# Patient Record
Sex: MALE | Race: WHITE | Employment: UNEMPLOYED | URBAN - METROPOLITAN AREA
[De-identification: names, ages, dates, MRNs, and addresses within clinical notes are randomized per-mention and may not be internally consistent; named-entity substitution may affect disease eponyms.]

---

## 2021-04-18 ENCOUNTER — HOSPITAL ENCOUNTER (INPATIENT)
Age: 21
LOS: 1 days | Discharge: HOME | DRG: 203 | End: 2021-04-19
Attending: EMERGENCY MEDICINE | Admitting: INTERNAL MEDICINE
Payer: COMMERCIAL

## 2021-04-18 ENCOUNTER — APPOINTMENT (OUTPATIENT)
Dept: GENERAL RADIOLOGY | Age: 21
DRG: 203 | End: 2021-04-18
Attending: PHYSICIAN ASSISTANT
Payer: COMMERCIAL

## 2021-04-18 ENCOUNTER — HOSPITAL ENCOUNTER (EMERGENCY)
Age: 21
Discharge: HOME | DRG: 203 | End: 2021-04-18
Attending: EMERGENCY MEDICINE
Payer: COMMERCIAL

## 2021-04-18 VITALS
RESPIRATION RATE: 16 BRPM | OXYGEN SATURATION: 100 % | HEIGHT: 71 IN | DIASTOLIC BLOOD PRESSURE: 70 MMHG | TEMPERATURE: 98.4 F | BODY MASS INDEX: 23.1 KG/M2 | HEART RATE: 99 BPM | SYSTOLIC BLOOD PRESSURE: 125 MMHG | WEIGHT: 165 LBS

## 2021-04-18 DIAGNOSIS — J45.41 MODERATE PERSISTENT ASTHMA WITH ACUTE EXACERBATION: Primary | ICD-10-CM

## 2021-04-18 DIAGNOSIS — J45.51 SEVERE PERSISTENT ASTHMA WITH ACUTE EXACERBATION: Primary | ICD-10-CM

## 2021-04-18 PROBLEM — J45.901 ACUTE ASTHMA EXACERBATION: Status: ACTIVE | Noted: 2021-04-18

## 2021-04-18 LAB
ALBUMIN SERPL-MCNC: 4.2 G/DL (ref 3.4–5)
ALBUMIN/GLOB SERPL: 1.2 {RATIO} (ref 0.8–1.7)
ALP SERPL-CCNC: 99 U/L (ref 45–117)
ALT SERPL-CCNC: 29 U/L (ref 16–61)
ANION GAP SERPL CALC-SCNC: 14 MMOL/L (ref 3–18)
AST SERPL-CCNC: 22 U/L (ref 10–38)
BASOPHILS # BLD: 0 K/UL (ref 0–0.1)
BASOPHILS NFR BLD: 0 % (ref 0–2)
BILIRUB SERPL-MCNC: 0.5 MG/DL (ref 0.2–1)
BNP SERPL-MCNC: 121 PG/ML (ref 0–450)
BUN SERPL-MCNC: 10 MG/DL (ref 7–18)
BUN/CREAT SERPL: 9 (ref 12–20)
CALCIUM SERPL-MCNC: 9.4 MG/DL (ref 8.5–10.1)
CHLORIDE SERPL-SCNC: 105 MMOL/L (ref 100–111)
CK MB CFR SERPL CALC: 1.1 % (ref 0–4)
CK MB SERPL-MCNC: 1.7 NG/ML (ref 5–25)
CK SERPL-CCNC: 154 U/L (ref 39–308)
CO2 SERPL-SCNC: 20 MMOL/L (ref 21–32)
CREAT SERPL-MCNC: 1.16 MG/DL (ref 0.6–1.3)
DIFFERENTIAL METHOD BLD: ABNORMAL
EOSINOPHIL # BLD: 0 K/UL (ref 0–0.4)
EOSINOPHIL NFR BLD: 0 % (ref 0–5)
ERYTHROCYTE [DISTWIDTH] IN BLOOD BY AUTOMATED COUNT: 13.2 % (ref 11.6–14.5)
GLOBULIN SER CALC-MCNC: 3.5 G/DL (ref 2–4)
GLUCOSE SERPL-MCNC: 199 MG/DL (ref 74–99)
HCT VFR BLD AUTO: 46.2 % (ref 36–48)
HGB BLD-MCNC: 16.1 G/DL (ref 13–16)
LYMPHOCYTES # BLD: 0.4 K/UL (ref 0.9–3.6)
LYMPHOCYTES NFR BLD: 3 % (ref 21–52)
MCH RBC QN AUTO: 30.5 PG (ref 24–34)
MCHC RBC AUTO-ENTMCNC: 34.8 G/DL (ref 31–37)
MCV RBC AUTO: 87.5 FL (ref 74–97)
MONOCYTES # BLD: 0.1 K/UL (ref 0.05–1.2)
MONOCYTES NFR BLD: 1 % (ref 3–10)
NEUTS SEG # BLD: 10.8 K/UL (ref 1.8–8)
NEUTS SEG NFR BLD: 95 % (ref 40–73)
PLATELET # BLD AUTO: 191 K/UL (ref 135–420)
PMV BLD AUTO: 11.9 FL (ref 9.2–11.8)
POTASSIUM SERPL-SCNC: 3.3 MMOL/L (ref 3.5–5.5)
PROT SERPL-MCNC: 7.7 G/DL (ref 6.4–8.2)
RBC # BLD AUTO: 5.28 M/UL (ref 4.35–5.65)
SODIUM SERPL-SCNC: 139 MMOL/L (ref 136–145)
TROPONIN I SERPL-MCNC: <0.02 NG/ML (ref 0–0.04)
WBC # BLD AUTO: 11.3 K/UL (ref 4.6–13.2)

## 2021-04-18 PROCEDURE — 74011000250 HC RX REV CODE- 250: Performed by: EMERGENCY MEDICINE

## 2021-04-18 PROCEDURE — 71045 X-RAY EXAM CHEST 1 VIEW: CPT

## 2021-04-18 PROCEDURE — 83880 ASSAY OF NATRIURETIC PEPTIDE: CPT

## 2021-04-18 PROCEDURE — 65270000029 HC RM PRIVATE

## 2021-04-18 PROCEDURE — 99283 EMERGENCY DEPT VISIT LOW MDM: CPT

## 2021-04-18 PROCEDURE — 80053 COMPREHEN METABOLIC PANEL: CPT

## 2021-04-18 PROCEDURE — 74011250636 HC RX REV CODE- 250/636: Performed by: INTERNAL MEDICINE

## 2021-04-18 PROCEDURE — 74011636637 HC RX REV CODE- 636/637: Performed by: PHYSICIAN ASSISTANT

## 2021-04-18 PROCEDURE — 77030013140 HC MSK NEB VYRM -A

## 2021-04-18 PROCEDURE — 85025 COMPLETE CBC W/AUTO DIFF WBC: CPT

## 2021-04-18 PROCEDURE — 74011250636 HC RX REV CODE- 250/636: Performed by: EMERGENCY MEDICINE

## 2021-04-18 PROCEDURE — 74011000250 HC RX REV CODE- 250: Performed by: INTERNAL MEDICINE

## 2021-04-18 PROCEDURE — 94762 N-INVAS EAR/PLS OXIMTRY CONT: CPT

## 2021-04-18 PROCEDURE — 82553 CREATINE MB FRACTION: CPT

## 2021-04-18 PROCEDURE — 94640 AIRWAY INHALATION TREATMENT: CPT

## 2021-04-18 PROCEDURE — 93005 ELECTROCARDIOGRAM TRACING: CPT

## 2021-04-18 PROCEDURE — 74011250637 HC RX REV CODE- 250/637: Performed by: EMERGENCY MEDICINE

## 2021-04-18 PROCEDURE — 74011000250 HC RX REV CODE- 250: Performed by: PHYSICIAN ASSISTANT

## 2021-04-18 RX ORDER — IPRATROPIUM BROMIDE AND ALBUTEROL SULFATE 2.5; .5 MG/3ML; MG/3ML
3 SOLUTION RESPIRATORY (INHALATION)
Status: COMPLETED | OUTPATIENT
Start: 2021-04-18 | End: 2021-04-18

## 2021-04-18 RX ORDER — PREDNISONE 20 MG/1
60 TABLET ORAL DAILY
Qty: 12 TAB | Refills: 0 | Status: SHIPPED | OUTPATIENT
Start: 2021-04-18 | End: 2021-04-19

## 2021-04-18 RX ORDER — LORAZEPAM 2 MG/ML
0.5 INJECTION INTRAMUSCULAR ONCE
Status: COMPLETED | OUTPATIENT
Start: 2021-04-18 | End: 2021-04-18

## 2021-04-18 RX ORDER — POLYETHYLENE GLYCOL 3350 17 G/17G
17 POWDER, FOR SOLUTION ORAL DAILY PRN
Status: DISCONTINUED | OUTPATIENT
Start: 2021-04-18 | End: 2021-04-19 | Stop reason: HOSPADM

## 2021-04-18 RX ORDER — PREDNISONE 20 MG/1
60 TABLET ORAL
Status: COMPLETED | OUTPATIENT
Start: 2021-04-18 | End: 2021-04-18

## 2021-04-18 RX ORDER — IPRATROPIUM BROMIDE AND ALBUTEROL SULFATE 2.5; .5 MG/3ML; MG/3ML
3 SOLUTION RESPIRATORY (INHALATION)
Status: DISCONTINUED | OUTPATIENT
Start: 2021-04-18 | End: 2021-04-19 | Stop reason: HOSPADM

## 2021-04-18 RX ORDER — ALBUTEROL SULFATE 2.5 MG/.5ML
10 SOLUTION RESPIRATORY (INHALATION)
Status: COMPLETED | OUTPATIENT
Start: 2021-04-18 | End: 2021-04-18

## 2021-04-18 RX ORDER — ACETAMINOPHEN 650 MG/1
650 SUPPOSITORY RECTAL
Status: DISCONTINUED | OUTPATIENT
Start: 2021-04-18 | End: 2021-04-19 | Stop reason: HOSPADM

## 2021-04-18 RX ORDER — MAGNESIUM SULFATE 1 G/100ML
1 INJECTION INTRAVENOUS ONCE
Status: COMPLETED | OUTPATIENT
Start: 2021-04-18 | End: 2021-04-18

## 2021-04-18 RX ORDER — FLUTICASONE PROPIONATE 50 MCG
2 SPRAY, SUSPENSION (ML) NASAL DAILY
Status: DISCONTINUED | OUTPATIENT
Start: 2021-04-19 | End: 2021-04-19 | Stop reason: HOSPADM

## 2021-04-18 RX ORDER — ONDANSETRON 2 MG/ML
4 INJECTION INTRAMUSCULAR; INTRAVENOUS
Status: DISCONTINUED | OUTPATIENT
Start: 2021-04-18 | End: 2021-04-19 | Stop reason: HOSPADM

## 2021-04-18 RX ORDER — ALBUTEROL SULFATE 1.25 MG/3ML
1.25 SOLUTION RESPIRATORY (INHALATION)
Qty: 25 EACH | Refills: 0 | Status: SHIPPED | OUTPATIENT
Start: 2021-04-18

## 2021-04-18 RX ORDER — TERBUTALINE SULFATE 1 MG/ML
0.25 INJECTION SUBCUTANEOUS
Status: COMPLETED | OUTPATIENT
Start: 2021-04-18 | End: 2021-04-18

## 2021-04-18 RX ORDER — SODIUM CHLORIDE 0.9 % (FLUSH) 0.9 %
5-40 SYRINGE (ML) INJECTION EVERY 8 HOURS
Status: DISCONTINUED | OUTPATIENT
Start: 2021-04-18 | End: 2021-04-19 | Stop reason: HOSPADM

## 2021-04-18 RX ORDER — KETOROLAC TROMETHAMINE 30 MG/ML
30 INJECTION, SOLUTION INTRAMUSCULAR; INTRAVENOUS ONCE
Status: COMPLETED | OUTPATIENT
Start: 2021-04-18 | End: 2021-04-18

## 2021-04-18 RX ORDER — SODIUM CHLORIDE 0.9 % (FLUSH) 0.9 %
5-40 SYRINGE (ML) INJECTION AS NEEDED
Status: DISCONTINUED | OUTPATIENT
Start: 2021-04-18 | End: 2021-04-19 | Stop reason: HOSPADM

## 2021-04-18 RX ORDER — LORATADINE 10 MG/1
10 TABLET ORAL
Status: COMPLETED | OUTPATIENT
Start: 2021-04-18 | End: 2021-04-18

## 2021-04-18 RX ORDER — LORAZEPAM 1 MG/1
1 TABLET ORAL
Status: DISCONTINUED | OUTPATIENT
Start: 2021-04-18 | End: 2021-04-18

## 2021-04-18 RX ORDER — ACETAMINOPHEN 325 MG/1
650 TABLET ORAL
Status: DISCONTINUED | OUTPATIENT
Start: 2021-04-18 | End: 2021-04-19 | Stop reason: HOSPADM

## 2021-04-18 RX ORDER — LORATADINE 10 MG/1
10 TABLET ORAL
Status: DISCONTINUED | OUTPATIENT
Start: 2021-04-18 | End: 2021-04-19 | Stop reason: HOSPADM

## 2021-04-18 RX ORDER — ENOXAPARIN SODIUM 100 MG/ML
40 INJECTION SUBCUTANEOUS DAILY
Status: DISCONTINUED | OUTPATIENT
Start: 2021-04-19 | End: 2021-04-19 | Stop reason: HOSPADM

## 2021-04-18 RX ADMIN — IPRATROPIUM BROMIDE AND ALBUTEROL SULFATE 3 ML: .5; 3 SOLUTION RESPIRATORY (INHALATION) at 16:40

## 2021-04-18 RX ADMIN — TERBUTALINE SULFATE 0.25 MG: 1 INJECTION, SOLUTION SUBCUTANEOUS at 21:34

## 2021-04-18 RX ADMIN — MAGNESIUM SULFATE HEPTAHYDRATE 1 G: 1 INJECTION, SOLUTION INTRAVENOUS at 21:39

## 2021-04-18 RX ADMIN — IPRATROPIUM BROMIDE AND ALBUTEROL SULFATE 3 ML: .5; 3 SOLUTION RESPIRATORY (INHALATION) at 16:20

## 2021-04-18 RX ADMIN — LORATADINE 10 MG: 10 TABLET ORAL at 20:43

## 2021-04-18 RX ADMIN — PREDNISONE 60 MG: 20 TABLET ORAL at 15:34

## 2021-04-18 RX ADMIN — KETOROLAC TROMETHAMINE 30 MG: 30 INJECTION, SOLUTION INTRAMUSCULAR at 21:34

## 2021-04-18 RX ADMIN — IPRATROPIUM BROMIDE AND ALBUTEROL SULFATE 3 ML: .5; 3 SOLUTION RESPIRATORY (INHALATION) at 15:22

## 2021-04-18 RX ADMIN — FAMOTIDINE 20 MG: 10 INJECTION, SOLUTION INTRAVENOUS at 22:44

## 2021-04-18 RX ADMIN — LORAZEPAM 0.5 MG: 2 INJECTION INTRAMUSCULAR; INTRAVENOUS at 21:34

## 2021-04-18 RX ADMIN — Medication 10 ML: at 22:45

## 2021-04-18 RX ADMIN — METHYLPREDNISOLONE SODIUM SUCCINATE 80 MG: 40 INJECTION, POWDER, FOR SOLUTION INTRAMUSCULAR; INTRAVENOUS at 22:42

## 2021-04-18 RX ADMIN — AZITHROMYCIN MONOHYDRATE 500 MG: 500 INJECTION, POWDER, LYOPHILIZED, FOR SOLUTION INTRAVENOUS at 22:51

## 2021-04-18 RX ADMIN — ALBUTEROL SULFATE 10 MG: 2.5 SOLUTION RESPIRATORY (INHALATION) at 20:18

## 2021-04-18 NOTE — ED PROVIDER NOTES
EMERGENCY DEPARTMENT HISTORY AND PHYSICAL EXAM    Date: 4/18/2021  Patient Name: Arnaldo Morales    History of Presenting Illness     Chief Complaint   Patient presents with    Wheezing         History Provided By: Patient and Patient's Father    Additional History (Context):   Arnaldo Morales is a 24 y.o. male with PMHX asthma, marijuana use presents to the emergency department via private vehicle C/O shortness of breath, wheezing that started shortly after returning home after being evaluated in the emergency department earlier today. Patient endorsing shortness of breath, chest tightness. Patient does report a history of allergies and reports congestion and rhinorrhea. Reports that upon returning home from the emergency department, had improved symptoms for approximately 1 hour. Pt denies cough, fever, abdominal pain, nausea, vomiting, and any other sxs or complaints. No relieving or exacerbating factors identified. PCP: Gomez Menard MD    Current Facility-Administered Medications   Medication Dose Route Frequency Provider Last Rate Last Admin    magnesium sulfate 1 g/100 ml IVPB (premix or compounded)  1 g IntraVENous ONCE Candace Whelan,  mL/hr at 04/18/21 2139 1 g at 04/18/21 2139     Current Outpatient Medications   Medication Sig Dispense Refill    blood-glucose meter (ASCENSIA BRIO) by Does Not Apply route daily.  albuterol (ACCUNEB) 1.25 mg/3 mL nebu Take 3 mL by inhalation every four (4) hours as needed for Wheezing (wheezing). 25 Each 0    predniSONE (DELTASONE) 20 mg tablet Take 60 mg by mouth daily for 4 days. Next dose Monday morning. Take with food. Indications: worsening asthma 12 Tab 0       Past History     Past Medical History:  Past Medical History:   Diagnosis Date    Asthma        Past Surgical History:  Past Surgical History:   Procedure Laterality Date    HX APPENDECTOMY         Family History:  History reviewed.  No pertinent family history. Social History:  Social History     Tobacco Use    Smoking status: Never Smoker    Smokeless tobacco: Never Used   Substance Use Topics    Alcohol use: Yes     Comment: rare    Drug use: Yes     Types: Marijuana       Allergies: Allergies   Allergen Reactions    Other Plant, Animal, Environmental Hives     Cats and dogs    Seasonale [Levonorgestrel-Ethinyl Estrad] Cough         Review of Systems   Review of Systems   Constitutional: Negative for chills and fever. HENT: Positive for congestion and rhinorrhea. Negative for ear pain, sinus pain and sore throat. Eyes: Negative for pain and visual disturbance. Respiratory: Positive for chest tightness, shortness of breath and wheezing. Negative for cough. Cardiovascular: Negative for chest pain and leg swelling. Gastrointestinal: Negative for abdominal pain, constipation, diarrhea, nausea and vomiting. Genitourinary: Negative for dysuria and hematuria. Musculoskeletal: Negative for back pain and neck pain. Skin: Negative for pallor and rash. Neurological: Negative for dizziness, tremors, weakness, light-headedness and headaches. All other systems reviewed and are negative.       Physical Exam     Vitals:    04/18/21 1939 04/18/21 2028 04/18/21 2134   BP: 139/66     Pulse: (!) 104  (!) 117   Resp: 20     Temp: 98 °F (36.7 °C)     SpO2: 97% 100%    Weight: 77.1 kg (170 lb)     Height: 5' 11\" (1.803 m)       Physical Exam    Nursing note and vitals reviewed    Constitutional: Leafy Alcazar  male, in mild respiratory distress, tachypneic, dyspneic, appears stated age  Head: Normocephalic, Atraumatic  Eyes: Pupils are equal, round, and reactive to light, EOMI, noninjected conjunctiva  Neck: Supple, non-tender, trachea midline, no JVD  Cardiovascular: Tachycardic, no murmurs, rubs, or gallops, + 2 radial pulses bilaterally  Chest: Mildly tachypneic and dyspneic and symmetrical chest excursion bilaterally  Lungs: Good aeration but with moderate scattered wheezes, no rales  Abdomen: Soft, non tender, non distended, normoactive bowel sounds  Back: No evidence of trauma or deformity  Extremities: No evidence of trauma or deformity, no LE edema. No streaking erythema, vesicular lesions, ulcerations or bulla  Skin: Warm and dry, normal cap refill  Neuro: Alert and appropriate, CN intact, normal speech, moving all 4 extremities freely and symmetrically  Psychiatric: Normal mood and affect       Diagnostic Study Results     Labs -   No results found for this or any previous visit (from the past 12 hour(s)). Radiologic Studies -   No orders to display     CT Results  (Last 48 hours)    None        CXR Results  (Last 48 hours)               04/18/21 1658  XR CHEST PORT Final result    Impression:  No acute process       Narrative:  EXAM:  AP Portable Chest X-ray 1 view        INDICATION: Cough and asthma       COMPARISON: None       _______________       FINDINGS:  Heart and mediastinal contours are within normal limits for portable   radiograph. Lungs are clear of active disease. There are no pleural effusions. No acute osseous findings. ________________                       Medical Decision Making   I am the first provider for this patient. I reviewed the vital signs, available nursing notes, past medical history, past surgical history, family history and social history. Vital Signs-Reviewed the patient's vital signs. Pulse Oximetry Analysis -97% on room air    Cardiac Monitor:  Rate: 104 bpm  Rhythm: Regular    EKG interpretation: (Preliminary)  7:51 PM   Sinus tachycardia 111 bpm.  NM interval 150 ms. QRS 92 ms. QTc 429 ms. No acute ST elevation  Records Reviewed: Nursing Notes and Old Medical Records    Provider Notes:   24 y.o. male who presents with persistent wheezing after receiving duo nebs, steroids earlier today from the emergency department. On exam patient is afebrile. Normotensive.   However in mild respiratory distress, mildly tachypneic and dyspneic. He has good aeration however with moderate scattered wheezes despite the treatments he received earlier today. Will start continuous albuterol treatment and reassess. Patient has had chest x-ray earlier today which showed no acute process, there is no indication for repeat imaging. Patient with a history of allergies, suspect allergy triggered asthma exacerbation. Will give a dose of Claritin. Procedures:  Procedures    ED Course:   7:51 PM   Initial assessment performed. The patients presenting problems have been discussed, and they are in agreement with the care plan formulated and outlined with them. I have encouraged them to ask questions as they arise throughout their visit. 9:41 PM  On reassessment, patient continues to complain of  shortness of breath and chest tightness. Patient is very anxious and states \"it really hurts to take a  Breath\". Dad requesting a dose of Ativan. Still wheezing on assessment. Will start magnesium, terbutaline for persistent asthma exacerbation. Due to his persistent wheezing and shortness of breath, will admit. Diagnosis and Disposition       9:39 PM  I have spent 120 minutes of critical care time involved in lab review, consultations with specialist, family decision-making, and documentation. During this entire length of time I was immediately available to the patient. Critical Care: The reason for providing this level of medical care for this critically ill patient was due a critical illness that impaired one or more vital organ systems such that there was a high probability of imminent or life threatening deterioration in the patients condition. This care involved high complexity decision making to assess, manipulate, and support vital system functions, to treat this degreee vital organ system failure and to prevent further life threatening deterioration of the patients condition. Core Measures:   For Hospitalized Patients:    1. Hospitalization Decision Time:  The decision to hospitalize the patient was made by Jose Cisneros DO at 9:40 PM on 4/18/2021    2. Aspirin: Aspirin was not given because the patient did not present with a stroke at the time of their Emergency Department evaluation    9:39 PM  Patient is being admitted to the hospital by Sherice Gregory MD. The results of their tests and reasons for their admission have been discussed with them and/or available family. They convey agreement and understanding for the need to be admitted and for their admission diagnosis. CONDITIONS ON ADMISSION:  Sepsis is not present at the time of admission. MRSA is not present at the time of admission. Wound infection is not present at the time of admission. Pressure Ulcer is not present at the time of admission. CLINICAL IMPRESSION:    1. Severe persistent asthma with acute exacerbation      ____________________________________     Please note that this dictation was completed with [employer], the computer voice recognition software. Quite often unanticipated grammatical, syntax, homophones, and other interpretive errors are inadvertently transcribed by the computer software. Please disregard these errors. Please excuse any errors that have escaped final proofreading.

## 2021-04-18 NOTE — LETTER
NOTIFICATION RETURN TO WORK / SCHOOL 
 
4/18/2021 5:31 PM 
 
Mr. Addy Nguyen [address] [place] [address] To Whom It May Concern: 
 
Addy Nguyen is currently under the care of THE [facility] EMERGENCY DEPT. He will return to work/school on: 4/20/21 If there are questions or concerns please have the patient contact our office. Sincerely, Carol Jasmine PA-C

## 2021-04-18 NOTE — ED TRIAGE NOTES
Patient arrives from home c/o difficulty breathing. HX of asthma. Patient was here earlier today and given 3 breathing treatments. Discharged and within an hour began feeling SOB again. Home treatments not working.  AOx4

## 2021-04-18 NOTE — Clinical Note
Status[de-identified] INPATIENT [101]   Type of Bed: Medical [8]   Inpatient Hospitalization Certified Necessary for the Following Reasons: 3.  Patient receiving treatment that can only be provided in an inpatient setting (further clarification in H&P documentation)   Admitting Diagnosis: Acute asthma exacerbation [9615152]   Admitting Physician: Humberto Lopez [[id number]]   Attending Physician: Humberto Lopez [[id number]]   Estimated Length of Stay: 3-4 Midnights   Discharge Plan[de-identified] Home with Office Follow-up

## 2021-04-18 NOTE — ED PROVIDER NOTES
EMERGENCY DEPARTMENT HISTORY AND PHYSICAL EXAM    Date: 4/18/2021  Patient Name: Minna Reyes    History of Presenting Illness     Chief Complaint   Patient presents with    Asthma         History Provided By: Patient, Patient's Father and Patient's Mother    Chief Complaint: asthma exaceration    HPI(Context):   5:18 PM  Minna Reyes is a 24 y.o. male with PMHX of asthma, marijuana use who presents to the emergency department C/O asthma exacerbation. Associated sxs include SOB, chest tightness, and wheezing. Sxs x one day. Pt reports triggers are pollen and he feels this may have caused sxs. Pt took several nebs at home with no relief. Pt has no hx of intubation or inpatient for asthma. No COVID exposures. Pt denies fever, chills, loss of taste or smell, cough, myalgias, and any other sxs or complaints. Pt denies tobacco use. Pt endorses habitual marijuana use. PCP: Felicia Mobley MD    Current Outpatient Medications   Medication Sig Dispense Refill    blood-glucose meter (Hamp Ping) by Does Not Apply route daily.  albuterol (ACCUNEB) 1.25 mg/3 mL nebu Take 3 mL by inhalation every four (4) hours as needed for Wheezing (wheezing). 25 Each 0    predniSONE (DELTASONE) 20 mg tablet Take 60 mg by mouth daily for 4 days. Next dose Monday morning. Take with food. Indications: worsening asthma 12 Tab 0       Past History     Past Medical History:  Past Medical History:   Diagnosis Date    Asthma        Past Surgical History:  Past Surgical History:   Procedure Laterality Date    HX APPENDECTOMY         Family History:  History reviewed. No pertinent family history. Social History:  Social History     Tobacco Use    Smoking status: Never Smoker    Smokeless tobacco: Never Used   Substance Use Topics    Alcohol use: Yes     Comment: rare    Drug use: Yes     Types: Marijuana       Allergies:   Allergies   Allergen Reactions    Other Plant, Animal, Environmental Hives     Cats and dogs    Seasonale [Levonorgestrel-Ethinyl Estrad] Cough         Review of Systems   Review of Systems   Constitutional: Negative for chills and fever. HENT: Negative for congestion and sore throat. Denies loss of taste or smell     Respiratory: Positive for chest tightness, shortness of breath and wheezing. Cardiovascular: Negative for chest pain. Musculoskeletal: Negative for myalgias. Allergic/Immunologic: Positive for environmental allergies. Neurological: Negative for headaches. All other systems reviewed and are negative. Physical Exam     Vitals:    04/18/21 1513 04/18/21 1603 04/18/21 1614 04/18/21 1659   BP: 125/70      Pulse: (!) 107 (!) 111 99    Resp: 18 16     Temp: 98.4 °F (36.9 °C)      SpO2: 98% 97%  100%   Weight: 74.8 kg (165 lb)      Height: 5' 11\" (1.803 m)        Physical Exam  Vitals signs and nursing note reviewed. Constitutional:       General: He is in acute distress. Appearance: He is well-developed. He is not diaphoretic. Comments:  male in mild resp distress. Alert. Speaking in phrases. Parents at bedside. HENT:      Head: Normocephalic and atraumatic. Right Ear: External ear normal.      Left Ear: External ear normal.      Nose: Congestion present. Eyes:      General: No scleral icterus. Right eye: No discharge. Left eye: No discharge. Conjunctiva/sclera: Conjunctivae normal.   Neck:      Musculoskeletal: Normal range of motion. Cardiovascular:      Rate and Rhythm: Regular rhythm. Tachycardia present. Heart sounds: Normal heart sounds. No murmur. No friction rub. No gallop. Pulmonary:      Effort: Tachypnea and accessory muscle usage present. Breath sounds: Examination of the right-upper field reveals wheezing. Examination of the left-upper field reveals wheezing. Examination of the right-middle field reveals wheezing. Examination of the left-middle field reveals wheezing.  Examination of the right-lower field reveals wheezing. Examination of the left-lower field reveals wheezing. Wheezing present. No decreased breath sounds, rhonchi or rales. Musculoskeletal: Normal range of motion. Right lower leg: No edema. Left lower leg: No edema. Skin:     General: Skin is warm and dry. Neurological:      Mental Status: He is alert and oriented to person, place, and time. Psychiatric:         Behavior: Behavior normal.         Judgment: Judgment normal.             Diagnostic Study Results     Labs -   No results found for this or any previous visit (from the past 12 hour(s)). Radiologic Studies -   5:18 PM  RADIOLOGY FINDINGS  CXR shows NAP  Pending review by Radiologist  Recorded by Mariannalaura Sanchez, PA-C    XR CHEST PORT    (Results Pending)     CT Results  (Last 48 hours)    None        CXR Results  (Last 48 hours)    None          Medications given in the ED-  Medications   albuterol-ipratropium (DUO-NEB) 2.5 MG-0.5 MG/3 ML (3 mL Nebulization Given 4/18/21 1522)   predniSONE (DELTASONE) tablet 60 mg (60 mg Oral Given 4/18/21 1534)   albuterol-ipratropium (DUO-NEB) 2.5 MG-0.5 MG/3 ML (3 mL Nebulization Given 4/18/21 1620)   albuterol-ipratropium (DUO-NEB) 2.5 MG-0.5 MG/3 ML (3 mL Nebulization Given 4/18/21 1640)         Medical Decision Making   I am the first provider for this patient. I reviewed the vital signs, available nursing notes, past medical history, past surgical history, family history and social history. Vital Signs-Reviewed the patient's vital signs. Pulse Oximetry Analysis - 100% on RA. NORMAL     Records Reviewed: Nursing Notes    Provider Notes (Medical Decision Making): asthma exacerbation, PNA, allergic. Doubt COVID    Procedures:  Procedures    ED Course:   5:18 PM Initial assessment performed. The patients presenting problems have been discussed, and they are in agreement with the care plan formulated and outlined with them.   I have encouraged them to ask questions as they arise throughout their visit. Diagnosis and Disposition       5:26 PM  Pt presented with wheezing and chest tightness in mild resp distress. No hypoxia. After multiple dup nebs and prednisone, pt feeling better. Still wheezing but pt refuses IV and feels it will get better as chest pressure has resolved. Will Rx prednisone and refill albuterol nebs q4-q6. Strict RTED precautions. Doubt COVID. All questions answered. Pt feels comfortable going home at this time. Pt expressed understanding and he agrees with plan. 1. Moderate persistent asthma with acute exacerbation      CRITICAL CARE NOTE:  5:29 PM  I have spent 36 minutes of critical care time involved in lab review, consultations with specialist, family decision-making, and documentation. During this entire length of time I was immediately available to the patient. Critical Care: The reason for providing this level of medical care for this critically ill patient was due a critical illness that impaired one or more vital organ systems such that there was a high probability of imminent or life threatening deterioration in the patients condition. This care involved high complexity decision making to assess, manipulate, and support vital system functions, to treat this degreee vital organ system failure and to prevent further life threatening deterioration of the patients condition. PLAN:  1. D/C Home  2. Current Discharge Medication List      START taking these medications    Details   albuterol (ACCUNEB) 1.25 mg/3 mL nebu Take 3 mL by inhalation every four (4) hours as needed for Wheezing (wheezing). Qty: 25 Each, Refills: 0      predniSONE (DELTASONE) 20 mg tablet Take 60 mg by mouth daily for 4 days. Next dose Monday morning. Take with food. Indications: worsening asthma  Qty: 12 Tab, Refills: 0           3.    Follow-up Information     Follow up With Specialties Details Why Clover Casey MD Cardiology   [address] [place] [id number]  [phone]      [facility] EMERGENCY DEPT Emergency Medicine   [address] [place] [address]  [phone]        _______________________________    Attestations: This note is prepared by Kirsten Guerrero PA-C.  _______________________________        Please note that this dictation was completed with [employer], the computer voice recognition software. Quite often unanticipated grammatical, syntax, homophones, and other interpretive errors are inadvertently transcribed by the computer software. Please disregard these errors. Please excuse any errors that have escaped final proofreading.

## 2021-04-19 VITALS
RESPIRATION RATE: 18 BRPM | HEIGHT: 71 IN | WEIGHT: 170 LBS | HEART RATE: 98 BPM | OXYGEN SATURATION: 94 % | SYSTOLIC BLOOD PRESSURE: 105 MMHG | TEMPERATURE: 98 F | DIASTOLIC BLOOD PRESSURE: 53 MMHG | BODY MASS INDEX: 23.8 KG/M2

## 2021-04-19 PROBLEM — R73.02 GLUCOSE TOLERANCE DECREASED: Status: ACTIVE | Noted: 2021-04-19

## 2021-04-19 LAB
ALBUMIN SERPL-MCNC: 3.8 G/DL (ref 3.4–5)
ALBUMIN/GLOB SERPL: 1.3 {RATIO} (ref 0.8–1.7)
ALP SERPL-CCNC: 86 U/L (ref 45–117)
ALT SERPL-CCNC: 28 U/L (ref 16–61)
ANION GAP SERPL CALC-SCNC: 7 MMOL/L (ref 3–18)
AST SERPL-CCNC: 17 U/L (ref 10–38)
ATRIAL RATE: 111 BPM
BASOPHILS # BLD: 0 K/UL (ref 0–0.1)
BASOPHILS NFR BLD: 0 % (ref 0–2)
BILIRUB SERPL-MCNC: 0.6 MG/DL (ref 0.2–1)
BUN SERPL-MCNC: 12 MG/DL (ref 7–18)
BUN/CREAT SERPL: 14 (ref 12–20)
CALCIUM SERPL-MCNC: 9.3 MG/DL (ref 8.5–10.1)
CALCULATED P AXIS, ECG09: 73 DEGREES
CALCULATED R AXIS, ECG10: 62 DEGREES
CALCULATED T AXIS, ECG11: 0 DEGREES
CHLORIDE SERPL-SCNC: 107 MMOL/L (ref 100–111)
CO2 SERPL-SCNC: 26 MMOL/L (ref 21–32)
CREAT SERPL-MCNC: 0.86 MG/DL (ref 0.6–1.3)
DIAGNOSIS, 93000: NORMAL
DIFFERENTIAL METHOD BLD: ABNORMAL
EOSINOPHIL # BLD: 0 K/UL (ref 0–0.4)
EOSINOPHIL NFR BLD: 0 % (ref 0–5)
ERYTHROCYTE [DISTWIDTH] IN BLOOD BY AUTOMATED COUNT: 13.3 % (ref 11.6–14.5)
EST. AVERAGE GLUCOSE BLD GHB EST-MCNC: 88 MG/DL
GLOBULIN SER CALC-MCNC: 3 G/DL (ref 2–4)
GLUCOSE BLD STRIP.AUTO-MCNC: 139 MG/DL (ref 70–110)
GLUCOSE SERPL-MCNC: 144 MG/DL (ref 74–99)
HBA1C MFR BLD: 4.7 % (ref 4.2–5.6)
HCT VFR BLD AUTO: 43.9 % (ref 36–48)
HGB BLD-MCNC: 15.1 G/DL (ref 13–16)
LYMPHOCYTES # BLD: 0.3 K/UL (ref 0.9–3.6)
LYMPHOCYTES NFR BLD: 3 % (ref 21–52)
MAGNESIUM SERPL-MCNC: 2.1 MG/DL (ref 1.6–2.6)
MCH RBC QN AUTO: 30 PG (ref 24–34)
MCHC RBC AUTO-ENTMCNC: 34.4 G/DL (ref 31–37)
MCV RBC AUTO: 87.1 FL (ref 74–97)
MONOCYTES # BLD: 0.1 K/UL (ref 0.05–1.2)
MONOCYTES NFR BLD: 1 % (ref 3–10)
NEUTS SEG # BLD: 8.5 K/UL (ref 1.8–8)
NEUTS SEG NFR BLD: 95 % (ref 40–73)
P-R INTERVAL, ECG05: 150 MS
PLATELET # BLD AUTO: 188 K/UL (ref 135–420)
PMV BLD AUTO: 11.9 FL (ref 9.2–11.8)
POTASSIUM SERPL-SCNC: 3.3 MMOL/L (ref 3.5–5.5)
PROT SERPL-MCNC: 6.8 G/DL (ref 6.4–8.2)
Q-T INTERVAL, ECG07: 316 MS
QRS DURATION, ECG06: 92 MS
QTC CALCULATION (BEZET), ECG08: 429 MS
RBC # BLD AUTO: 5.04 M/UL (ref 4.35–5.65)
SODIUM SERPL-SCNC: 140 MMOL/L (ref 136–145)
VENTRICULAR RATE, ECG03: 111 BPM
WBC # BLD AUTO: 8.9 K/UL (ref 4.6–13.2)

## 2021-04-19 PROCEDURE — 94640 AIRWAY INHALATION TREATMENT: CPT

## 2021-04-19 PROCEDURE — 74011250637 HC RX REV CODE- 250/637: Performed by: HOSPITALIST

## 2021-04-19 PROCEDURE — 36415 COLL VENOUS BLD VENIPUNCTURE: CPT

## 2021-04-19 PROCEDURE — 74011000250 HC RX REV CODE- 250: Performed by: INTERNAL MEDICINE

## 2021-04-19 PROCEDURE — 74011250637 HC RX REV CODE- 250/637: Performed by: INTERNAL MEDICINE

## 2021-04-19 PROCEDURE — 82962 GLUCOSE BLOOD TEST: CPT

## 2021-04-19 PROCEDURE — 83735 ASSAY OF MAGNESIUM: CPT

## 2021-04-19 PROCEDURE — 94760 N-INVAS EAR/PLS OXIMETRY 1: CPT

## 2021-04-19 PROCEDURE — 83036 HEMOGLOBIN GLYCOSYLATED A1C: CPT

## 2021-04-19 PROCEDURE — 85025 COMPLETE CBC W/AUTO DIFF WBC: CPT

## 2021-04-19 PROCEDURE — 80053 COMPREHEN METABOLIC PANEL: CPT

## 2021-04-19 PROCEDURE — 74011250636 HC RX REV CODE- 250/636: Performed by: INTERNAL MEDICINE

## 2021-04-19 RX ORDER — INSULIN LISPRO 100 [IU]/ML
INJECTION, SOLUTION INTRAVENOUS; SUBCUTANEOUS
Status: DISCONTINUED | OUTPATIENT
Start: 2021-04-19 | End: 2021-04-19 | Stop reason: HOSPADM

## 2021-04-19 RX ORDER — PREDNISONE 20 MG/1
TABLET ORAL
Qty: 14 TAB | Refills: 0 | Status: SHIPPED | OUTPATIENT
Start: 2021-04-19

## 2021-04-19 RX ORDER — POTASSIUM CHLORIDE 20 MEQ/1
40 TABLET, EXTENDED RELEASE ORAL
Status: COMPLETED | OUTPATIENT
Start: 2021-04-19 | End: 2021-04-19

## 2021-04-19 RX ORDER — POTASSIUM CHLORIDE 20 MEQ/1
40 TABLET, EXTENDED RELEASE ORAL EVERY 4 HOURS
Status: DISPENSED | OUTPATIENT
Start: 2021-04-19 | End: 2021-04-19

## 2021-04-19 RX ORDER — DEXTROSE 50 % IN WATER (D50W) INTRAVENOUS SYRINGE
25-50 AS NEEDED
Status: DISCONTINUED | OUTPATIENT
Start: 2021-04-19 | End: 2021-04-19 | Stop reason: HOSPADM

## 2021-04-19 RX ORDER — MAGNESIUM SULFATE 100 %
4 CRYSTALS MISCELLANEOUS AS NEEDED
Status: DISCONTINUED | OUTPATIENT
Start: 2021-04-19 | End: 2021-04-19 | Stop reason: HOSPADM

## 2021-04-19 RX ORDER — AZITHROMYCIN DIHYDRATE 250 MG/1
250 TABLET, FILM COATED ORAL DAILY
Qty: 4 TAB | Refills: 0 | Status: SHIPPED | OUTPATIENT
Start: 2021-04-19 | End: 2021-04-23

## 2021-04-19 RX ORDER — FLUTICASONE FUROATE AND VILANTEROL TRIFENATATE 200; 25 UG/1; UG/1
1 POWDER RESPIRATORY (INHALATION) DAILY
Qty: 1 INHALER | Refills: 0 | Status: SHIPPED
Start: 2021-04-19

## 2021-04-19 RX ADMIN — IPRATROPIUM BROMIDE AND ALBUTEROL SULFATE 3 ML: .5; 3 SOLUTION RESPIRATORY (INHALATION) at 11:41

## 2021-04-19 RX ADMIN — IPRATROPIUM BROMIDE AND ALBUTEROL SULFATE 3 ML: .5; 3 SOLUTION RESPIRATORY (INHALATION) at 04:08

## 2021-04-19 RX ADMIN — IPRATROPIUM BROMIDE AND ALBUTEROL SULFATE 3 ML: .5; 3 SOLUTION RESPIRATORY (INHALATION) at 07:46

## 2021-04-19 RX ADMIN — Medication 10 ML: at 05:53

## 2021-04-19 RX ADMIN — METHYLPREDNISOLONE SODIUM SUCCINATE 80 MG: 40 INJECTION, POWDER, FOR SOLUTION INTRAMUSCULAR; INTRAVENOUS at 05:53

## 2021-04-19 RX ADMIN — ENOXAPARIN SODIUM 40 MG: 40 INJECTION SUBCUTANEOUS at 09:16

## 2021-04-19 RX ADMIN — POTASSIUM CHLORIDE 40 MEQ: 1500 TABLET, EXTENDED RELEASE ORAL at 05:57

## 2021-04-19 RX ADMIN — IPRATROPIUM BROMIDE AND ALBUTEROL SULFATE 3 ML: .5; 3 SOLUTION RESPIRATORY (INHALATION) at 00:11

## 2021-04-19 RX ADMIN — POTASSIUM CHLORIDE 40 MEQ: 1500 TABLET, EXTENDED RELEASE ORAL at 09:16

## 2021-04-19 RX ADMIN — FAMOTIDINE 20 MG: 10 INJECTION, SOLUTION INTRAVENOUS at 09:16

## 2021-04-19 NOTE — ROUTINE PROCESS
[id number] TRANSFER - IN REPORT:    Verbal report received from Sobia Magaña RN (name) on Addy Nguyen  being received from ED(unit) for routine progression of care    Report consisted of patients Situation, Background, Assessment and   Recommendations(SBAR). Information from the following report(s) SBAR, Kardex, Intake/Output, MAR and Recent Results was reviewed with the receiving nurse. Opportunity for questions and clarification was provided. Assessment completed upon patients arrival to unit and care assumed. Dual skin assessment performed at bedside w/ A. Colonel Room RN skin noted to be intact. Pt denies any SOB or pain at this time. [id number] Pt denies any pain or SOB throughout shift. 3892 Bedside and Verbal shift change report given to Jeramie Bradshaw RN (oncoming nurse) by Alfred Bernal RN (offgoing nurse). Report included the following information SBAR, Kardex, Intake/Output, MAR and Recent Results.

## 2021-04-19 NOTE — PROGRESS NOTES
Reason for Admission:    Acute Asthma Exacerbation                   RUR Score:   10%                  Plan for utilizing home health:          PCP: First and Last name:  Dr. Karena Lopes   Name of Practice:    Are you a current patient: Yes/No:  Yes   Approximate date of last visit: 4/1/2021   Can you participate in a virtual visit with your PCP:                     Current Advanced Directive/Advance Care Plan: Full Code      Healthcare Decision Maker:   Click here to complete [address] including selection of the Healthcare Decision Maker Relationship (ie \"Primary\")                             Transition of Care Plan:                    CM met with patient and his father at bedside. Pt states that he lives with his parents and they are able to assist with his care at home. He uses a nebulizer at home. Pt has designated his dad as his primary healthcare decision maker if needed. Florian Sanchez [phone]). His parents will transport him home after discharge. No d/c needs noted at this time. CM available to assist if needed. Care Management Interventions  PCP Verified by CM: Yes(Dr. Trini Peguero)  Last Visit to PCP: 04/05/21  Mode of Transport at Discharge:  Other (see comment)(Parents)  Current Support Network: Relative's Home(Lives with Parents)  The Plan for Transition of Care is Related to the Following Treatment Goals : Acute Asthma Exacerbation  Discharge Location  Discharge Placement: Home with family assistance

## 2021-04-19 NOTE — PROGRESS NOTES
Hospitalist Progress Note    Patient: Matthew Dobbins MRN: [id number]  CSN: [id number]    YOB: 2000  Age: 24 y.o.   Sex: male    DOA: 4/18/2021 LOS:  LOS: 1 day          See dc summary

## 2021-04-19 NOTE — DISCHARGE INSTRUCTIONS
Patient Education        Asthma Attack: Care Instructions  Your Care Instructions     During an asthma attack, the airways swell and narrow. This makes it hard to breathe. Severe asthma attacks can be life-threatening, but you can help prevent them by keeping your asthma under control and treating symptoms before they get bad. Symptoms include being short of breath, having chest tightness, coughing, and wheezing. Noting and treating these symptoms can also help you avoid future trips to the emergency room. The doctor has checked you carefully, but problems can develop later. If you notice any problems or new symptoms, get medical treatment right away. Follow-up care is a key part of your treatment and safety. Be sure to make and go to all appointments, and call your doctor if you are having problems. It's also a good idea to know your test results and keep a list of the medicines you take. How can you care for yourself at home? · Follow your asthma action plan to prevent and treat attacks. If you don't have an asthma action plan, work with your doctor to create one. · Take your asthma medicines exactly as prescribed. Talk to your doctor right away if you have any questions about how to take them. ? Use your quick-relief medicine when you have symptoms of an attack. Quick-relief medicine is usually an albuterol inhaler. Some people need to use quick-relief medicine before they exercise. ? Take your controller medicine every day, not just when you have symptoms. Controller medicine is usually an inhaled corticosteroid. The goal is to prevent problems before they occur. Don't use your controller medicine to treat an attack that has already started. It doesn't work fast enough to help. ? If your doctor prescribed corticosteroid pills to use during an attack, take them exactly as prescribed. It may take hours for the pills to work, but they may make the episode shorter and help you breathe better. ?  Keep your quick-relief medicine with you at all times. · Talk to your doctor before using other medicines. Some medicines, such as aspirin, can cause asthma attacks in some people. · If you have a peak flow meter, use it to check how well you are breathing. This can help you predict when an asthma attack is going to occur. Then you can take medicine to prevent the asthma attack or make it less severe. · Do not smoke or allow others to smoke around you. Avoid smoky places. Smoking makes asthma worse. If you need help quitting, talk to your doctor about stop-smoking programs and medicines. These can increase your chances of quitting for good. · Learn what triggers an asthma attack for you, and avoid the triggers when you can. Common triggers include colds, smoke, air pollution, dust, pollen, mold, pets, cockroaches, stress, and cold air. · Avoid colds and the flu. Get a pneumococcal vaccine shot. If you have had one before, ask your doctor if you need a second dose. Get a flu vaccine every fall. If you must be around people with colds or the flu, wash your hands often. When should you call for help? [id number] anytime you think you may need emergency care. For example, call if:  · You have severe trouble breathing. Call your doctor now or seek immediate medical care if:  · Your symptoms do not get better after you have followed your asthma action plan. · You have new or worse trouble breathing. · Your coughing and wheezing get worse. · You cough up dark brown or bloody mucus (sputum). · You have a new or higher fever. Watch closely for changes in your health, and be sure to contact your doctor if:  · You need to use quick-relief medicine on more than 2 days a week (unless it is just for exercise). · You cough more deeply or more often, especially if you notice more mucus or a change in the color of your mucus. · You are not getting better as expected. Where can you learn more?   Go to http://www.gray.com/  Enter [id number] in the search box to learn more about \"Asthma Attack: Care Instructions. \"  Current as of: February 24, 2020               Content Version: 12.5  © 2006-2020 [employer]. Care instructions adapted under license by [employer] (which disclaims liability or warranty for this information). If you have questions about a medical condition or this instruction, always ask your healthcare professional. [place] [address] any warranty or liability for your use of this information. Patient Education     Learning About Asthma Triggers  What are triggers? When you have asthma, certain things can make your symptoms worse. These are called triggers. They include:  · Cigarette smoke or air pollution. · Things you are allergic to, such as:  ¨ Pollen, mold, or dust mites. ¨ Pet hair, skin, or saliva. · Illnesses, like colds, flu, or pneumonia. · Exercise. · Dry, cold air. How do triggers affect asthma? Triggers can make it harder for your lungs to work as they should and can lead to sudden difficulty breathing and other symptoms. When you are around a trigger, an asthma attack is more likely. If your symptoms are severe, you may need emergency treatment or have to go to the hospital for treatment. If you know what your triggers are and can avoid them, you may be able to prevent asthma attacks, reduce how often you have them, and make them less severe. What can you do to avoid triggers? The first thing is to know your triggers. When you are having symptoms, note the things around you that might be causing them. Then look for patterns in what may be triggering your symptoms. Record your triggers on a piece of paper or in an asthma diary. When you have your list of possible triggers, work with your doctor to find ways to avoid them.   You also can check how well your lungs are working by measuring your peak expiratory flow (PEF) throughout the day. Your PEF may drop when you are near things that trigger symptoms. Here are some ways to avoid a few common triggers. · Do not smoke or allow others to smoke around you. If you need help quitting, talk to your doctor about stop-smoking programs and medicines. These can increase your chances of quitting for good. · If there is a lot of pollution, pollen, or dust outside, stay at home and keep your windows closed. Use an air conditioner or air filter in your home. Check your local weather report or newspaper for air quality and pollen reports. · Get a flu shot every year. Talk to your doctor about getting a pneumococcal shot. Wash your hands often to prevent infections. · Avoid exercising outdoors in cold weather. If you are outdoors in cold weather, wear a scarf around your face and breathe through your nose. How can you manage an asthma attack? · If you have an asthma action plan, follow the plan. In general:  ¨ Use your quick-relief inhaler as directed by your doctor. If your symptoms do not get better after you use your medicine, have someone take you to the emergency room. Call an ambulance if needed. ¨ If your doctor has given you other inhaled medicines or steroid pills, take them as directed. Where can you learn more? Go to [employer]  Enter M564 in the search box to learn more about \"Learning About Asthma Triggers. \"   © 4675-0133 [employer]. Care instructions adapted under license by [employer] (which disclaims liability or warranty for this information). This care instruction is for use with your licensed healthcare professional. If you have questions about a medical condition or this instruction, always ask your healthcare professional. [place] [address] any warranty or liability for your use of this information. Content Version: 13.4.526499;  Last Revised: February 23, 2012

## 2021-04-19 NOTE — PROGRESS NOTES
Problem: Pain  Goal: *Control of Pain  Outcome: Resolved/Met     Problem: Falls - Risk of  Goal: *Absence of Falls  Description: Document Benita Fall Risk and appropriate interventions in the flowsheet.   Outcome: Resolved/Met     Problem: Breathing Pattern - Ineffective  Goal: *Absence of hypoxia  Outcome: Resolved/Met  Goal: *Use of effective breathing techniques  Outcome: Resolved/Met

## 2021-04-19 NOTE — DISCHARGE SUMMARY
[address] [facility] SUMMARY    Name:  Aneudy Davidson  MR#:   [id number]  :  2000  ACCOUNT #:  [de-identified]  ADMIT DATE:  2021  DISCHARGE DATE:  2021      DISCHARGE DIAGNOSIS:  Acute asthma exacerbation, severe, persistent. HOSPITAL SUMMARY:  This 80-year-old gentleman with asthma came to the emergency room after he was given DuoNeb treatment as well as steroids and terbutaline, and was discharged home, but about three hours later he returned to the emergency room with worsening tachypnea. He was given more steroids and breathing treatments and he received Ativan and magnesium as well. The patient finally was brought under better control, but admitted overnight to ensure that he was improving. This morning he is off oxygen, he is feeling much better. He has some scattered inspiratory and expiratory wheezes, but appears without any muscle retractions or accessory use. He feels comfortable. He is talking in full sentences. He is in no acute distress and he wants to be able to go home. I have spoken to both him and his dad this morning. His heart rate is normal at 98, blood pressure 105/53, respiratory rate 18, SaO2 is 94% on room air. The patient was advised not to smoke marijuana by the admitting physician. His blood glucose is 139. His CBC is within normal limits. His metabolic panel showed potassium of 3.3 this morning, so he received potassium. He has been started on azithromycin since admission and IV steroids. Chest x-ray showed no acute process and EKG on admission showed sinus tachycardia. He is doing well. Again, he has been up and ambulatory. He would like to go home this afternoon. I discussed the plan of care with his father. Recommend continue albuterol nebs as needed every four hours for any wheezing. He has a rescue inhaler that I have called in a refill for.   He will continue his Breo Ellipta one puff daily and a prednisone taper from 40 mg over the next four days to then 20 mg for three days and 10 mg for three days. Due to the fact he has had high-dose steroids here and is improved, we can start the dosing at 40 mg for discharge. I have also called in azithromycin 250 mg once daily for four more days and continue the azithromycin plan that was started here in the emergency room with the initial dose at 500 mg. He should follow up with Kwame Valdez, Dr. Anabell Sampson, and his nurse practitioner within three days this week preferably. Discussed that with him and his father.       Sherry Garcia MD      [id number]  D:  04/19/2021 11:10  T:  04/19/2021 12:53  JOB #:  [id number]

## 2021-04-19 NOTE — ROUTINE PROCESS
TRANSFER - OUT REPORT:    Verbal report given to Michelle Ybarra RN (name) on Jarod Hilliard  being transferred to medical unit (unit) for routine progression of care       Report consisted of patients Situation, Background, Assessment and   Recommendations(SBAR). Information from the following report(s) SBAR, ED Summary, [facility] and Recent Results was reviewed with the receiving nurse. Lines:   Peripheral IV 04/18/21 Right Antecubital (Active)   Site Assessment Clean, dry, & intact 04/18/21 2138   Phlebitis Assessment 0 04/18/21 2138   Infiltration Assessment 0 04/18/21 2138   Dressing Status Clean, dry, & intact 04/18/21 2138   Dressing Type Transparent 04/18/21 2138   Hub Color/Line Status Pink 04/18/21 2138        Opportunity for questions and clarification was provided.       Patient transported with:   [employer]

## 2021-04-19 NOTE — H&P
History & Physical    Patient: Aurelia Delgado MRN: [id number]  CSN: [id number]    YOB: 2000  Age: 24 y.o. Sex: male      DOA: 4/18/2021    Chief Complaint:   Chief Complaint   Patient presents with    Wheezing          HPI:     Aurelia Delgado is a 24 y.o.  male who has history of asthma patient came to the emergency room earlier today and was given for DuoNeb treatments as well as steroids and terbutaline he felt better after treatment and was discharged home. Unfortunately after about 3 hours patient returned to the emergency room with worsening tachypnea very anxious he was given more steroids and duo nebs and I was asked to admit for further treatment. Patient is currently feeling better after his fifth albuterol treatment he was also given Ativan in the emergency room which has caused him to calm down. Patient denies any COVID-19 exposures he has not had the Covid vaccine    Past Medical History:   Diagnosis Date    Asthma        Past Surgical History:   Procedure Laterality Date    HX APPENDECTOMY         History reviewed. No pertinent family history. Social History     Socioeconomic History    Marital status: SINGLE     Spouse name: Not on file    Number of children: Not on file    Years of education: Not on file    Highest education level: Not on file   Tobacco Use    Smoking status: Never Smoker    Smokeless tobacco: Never Used   Substance and Sexual Activity    Alcohol use: Yes     Comment: rare    Drug use: Yes     Types: Marijuana       Prior to Admission medications    Medication Sig Start Date End Date Taking? Authorizing Provider   blood-glucose meter (ASCENSIA BRIO) by Does Not Apply route daily. Other, MD Fly   albuterol (ACCUNEB) 1.25 mg/3 mL nebu Take 3 mL by inhalation every four (4) hours as needed for Wheezing (wheezing). 4/18/21   Kirsty Deleon PA-C   predniSONE (DELTASONE) 20 mg tablet Take 60 mg by mouth daily for 4 days. Next dose Monday morning. Take with food. Indications: worsening asthma 21  Pedro Michelle PA-C       Allergies   Allergen Reactions    Other Plant, Animal, Environmental Hives     Cats and dogs    Seasonale [Levonorgestrel-Ethinyl Estrad] Cough         Review of Systems  GENERAL: Patient alert, awake and oriented times 3, able to communicate full sentences and not in distress. HEENT: No change in vision, no earache, tinnitus, sore throat or sinus congestion. NECK: No pain or stiffness. PULMONARY: + shortness of breath, +cough or wheeze. Cardiovascular: no pnd or orthopnea, no CP  GASTROINTESTINAL: No abdominal pain, nausea, vomiting or diarrhea, melena or bright red blood per rectum. GENITOURINARY: No urinary frequency, urgency, hesitancy or dysuria. MUSCULOSKELETAL: No joint or muscle pain, no back pain, no recent trauma. DERMATOLOGIC: No rash, no itching, no lesions. ENDOCRINE: No polyuria, polydipsia, no heat or cold intolerance. No recent change in weight. HEMATOLOGICAL: No anemia or easy bruising or bleeding. NEUROLOGIC: No headache, seizures, numbness, tingling or weakness. Physical Exam:     Physical Exam:  Visit Vitals  /66   Pulse (!) 104   Temp 98 °F (36.7 °C)   Resp 20   Ht 5' 11\" (1.803 m)   Wt 77.1 kg (170 lb)   SpO2 100%   BMI 23.71 kg/m²      O2 Device: None (Room air)    Temp (24hrs), Av.2 °F (36.8 °C), Min:98 °F (36.7 °C), Max:98.4 °F (36.9 °C)    No intake/output data recorded. No intake/output data recorded. General:  Alert, cooperative, no distress, appears stated age. Lethargic but arousable              Head: Normocephalic, without obvious abnormality, atraumatic. Eyes:  Conjunctivae/corneas clear. PERRL, EOMs intact. Nose: Nares normal. No drainage or sinus tenderness. Neck: Supple, symmetrical, trachea midline, no adenopathy, thyroid: no enlargement, no carotid bruit and no JVD. Lungs:   Clear to auscultation bilaterally.   Diffuse expiratory wheeze minimal   Heart:  Regular rate and rhythm, S1, S2 normal.  Tachycardia     Abdomen: Soft, non-tender. Bowel sounds normal.    Extremities: Extremities normal, atraumatic, no cyanosis or edema. Pulses: 2+ and symmetric all extremities. Skin:  No rashes or lesions   Neurologic: AAOx3, No focal motor or sensory deficit. Labs Reviewed:  Recent Results (from the past 12 hour(s))   CBC WITH AUTOMATED DIFF    Collection Time: 04/18/21  8:30 PM   Result Value Ref Range    WBC 11.3 4.6 - 13.2 K/uL    RBC 5.28 4.35 - 5.65 M/uL    HGB 16.1 (H) 13.0 - 16.0 g/dL    HCT 46.2 36.0 - 48.0 %    MCV 87.5 74.0 - 97.0 FL    MCH 30.5 24.0 - 34.0 PG    MCHC 34.8 31.0 - 37.0 g/dL    RDW 13.2 11.6 - 14.5 %    PLATELET 316 884 - 551 K/uL    MPV 11.9 (H) 9.2 - 11.8 FL    NEUTROPHILS 95 (H) 40 - 73 %    LYMPHOCYTES 3 (L) 21 - 52 %    MONOCYTES 1 (L) 3 - 10 %    EOSINOPHILS 0 0 - 5 %    BASOPHILS 0 0 - 2 %    ABS. NEUTROPHILS 10.8 (H) 1.8 - 8.0 K/UL    ABS. LYMPHOCYTES 0.4 (L) 0.9 - 3.6 K/UL    ABS. MONOCYTES 0.1 0.05 - 1.2 K/UL    ABS. EOSINOPHILS 0.0 0.0 - 0.4 K/UL    ABS. BASOPHILS 0.0 0.0 - 0.1 K/UL    DF AUTOMATED     METABOLIC PANEL, COMPREHENSIVE    Collection Time: 04/18/21  8:30 PM   Result Value Ref Range    Sodium 139 136 - 145 mmol/L    Potassium 3.3 (L) 3.5 - 5.5 mmol/L    Chloride 105 100 - 111 mmol/L    CO2 20 (L) 21 - 32 mmol/L    Anion gap 14 3.0 - 18 mmol/L    Glucose 199 (H) 74 - 99 mg/dL    BUN 10 7.0 - 18 MG/DL    Creatinine 1.16 0.6 - 1.3 MG/DL    BUN/Creatinine ratio 9 (L) 12 - 20      GFR est AA >60 >60 ml/min/1.73m2    GFR est non-AA >60 >60 ml/min/1.73m2    Calcium 9.4 8.5 - 10.1 MG/DL    Bilirubin, total 0.5 0.2 - 1.0 MG/DL    ALT (SGPT) 29 16 - 61 U/L    AST (SGOT) 22 10 - 38 U/L    Alk.  phosphatase 99 45 - 117 U/L    Protein, total 7.7 6.4 - 8.2 g/dL    Albumin 4.2 3.4 - 5.0 g/dL    Globulin 3.5 2.0 - 4.0 g/dL    A-G Ratio 1.2 0.8 - 1.7     NT-PRO BNP    Collection Time: 04/18/21  8:30 PM   Result Value Ref Range    NT pro- 0 - 450 PG/ML   CARDIAC PANEL,(CK, CKMB & TROPONIN)    Collection Time: 04/18/21  9:30 PM   Result Value Ref Range    CK - MB 1.7 <3.6 ng/ml    CK-MB Index 1.1 0.0 - 4.0 %     39 - 308 U/L    Troponin-I, QT <0.02 0.0 - 0.045 NG/ML     All lab results for the last 24 hours reviewed. and EKG    Procedures/imaging: see electronic medical records for all procedures/Xrays and details which were not copied into this note but were reviewed prior to creation of Plan      Assessment/Plan     Active Problems:    Acute asthma exacerbation (4/18/2021)    Hypokalemia    Anxiety    THC use     Plan:  1. Started on duo nebs as well as IV steroids  2. Potassium and magnesium replacement per protocol  3. Benadryl and hydroxyzine as needed DC Ativan as he has had excess sedation with this  4.   Advised to quit marijuana  DVT/GI Prophylaxis: Hep SQ        Geena Yepez MD  4/18/2021 9:31 PM

## 2021-04-20 LAB — GLUCOSE BLD STRIP.AUTO-MCNC: 117 MG/DL (ref 70–110)
